# Patient Record
Sex: FEMALE | Race: WHITE | Employment: FULL TIME | URBAN - METROPOLITAN AREA
[De-identification: names, ages, dates, MRNs, and addresses within clinical notes are randomized per-mention and may not be internally consistent; named-entity substitution may affect disease eponyms.]

---

## 2017-10-09 ENCOUNTER — LAB ENCOUNTER (OUTPATIENT)
Dept: LAB | Age: 47
End: 2017-10-09
Attending: OBSTETRICS & GYNECOLOGY
Payer: COMMERCIAL

## 2017-10-09 ENCOUNTER — OFFICE VISIT (OUTPATIENT)
Dept: OBGYN CLINIC | Facility: CLINIC | Age: 47
End: 2017-10-09

## 2017-10-09 VITALS
DIASTOLIC BLOOD PRESSURE: 60 MMHG | HEIGHT: 60 IN | BODY MASS INDEX: 31.02 KG/M2 | HEART RATE: 80 BPM | SYSTOLIC BLOOD PRESSURE: 120 MMHG | WEIGHT: 158 LBS

## 2017-10-09 DIAGNOSIS — Z23 NEED FOR VACCINATION: ICD-10-CM

## 2017-10-09 DIAGNOSIS — Z01.419 WELL WOMAN EXAM WITH ROUTINE GYNECOLOGICAL EXAM: Primary | ICD-10-CM

## 2017-10-09 DIAGNOSIS — Z01.419 WELL WOMAN EXAM WITH ROUTINE GYNECOLOGICAL EXAM: ICD-10-CM

## 2017-10-09 PROCEDURE — 99396 PREV VISIT EST AGE 40-64: CPT | Performed by: OBSTETRICS & GYNECOLOGY

## 2017-10-09 PROCEDURE — 88175 CYTOPATH C/V AUTO FLUID REDO: CPT | Performed by: OBSTETRICS & GYNECOLOGY

## 2017-10-09 PROCEDURE — 82306 VITAMIN D 25 HYDROXY: CPT | Performed by: OBSTETRICS & GYNECOLOGY

## 2017-10-09 PROCEDURE — 90686 IIV4 VACC NO PRSV 0.5 ML IM: CPT | Performed by: OBSTETRICS & GYNECOLOGY

## 2017-10-09 PROCEDURE — 90471 IMMUNIZATION ADMIN: CPT | Performed by: OBSTETRICS & GYNECOLOGY

## 2017-10-09 PROCEDURE — 80061 LIPID PANEL: CPT | Performed by: OBSTETRICS & GYNECOLOGY

## 2017-10-09 PROCEDURE — 87624 HPV HI-RISK TYP POOLED RSLT: CPT | Performed by: OBSTETRICS & GYNECOLOGY

## 2017-10-09 PROCEDURE — 80050 GENERAL HEALTH PANEL: CPT | Performed by: OBSTETRICS & GYNECOLOGY

## 2017-10-09 RX ORDER — METHIMAZOLE 5 MG/1
TABLET ORAL
COMMUNITY
Start: 2017-10-01 | End: 2017-10-09

## 2017-10-09 NOTE — PROGRESS NOTES
Carlita Arreaga is a 49year old female  No LMP recorded. Patient has had an ablation. Patient presents with:  Wellness Visit  . Occasionally has some pink spotting with wiping. She has had an ablation. Minimal hot flashes or night sweats.   I daily., Disp: , Rfl:   •  Multiple Vitamins-Iron (ONCE DAILY/IRON) Oral Tab, Take  by mouth daily. , Disp: , Rfl:     ALLERGIES:    Shellfish-Derived P*        Comment:Hives-      Review of Systems:  Constitutional:  Denies fatigue, night sweats, hot flashe PANEL (14); Future  -     LIPID PANEL;  Future  -     ASSAY, THYROID STIM HORMONE; Future  -     VITAMIN D, 25-HYDROXY; Future

## 2017-10-10 ENCOUNTER — MED REC SCAN ONLY (OUTPATIENT)
Dept: OBGYN CLINIC | Facility: CLINIC | Age: 47
End: 2017-10-10

## 2017-12-28 ENCOUNTER — HOSPITAL ENCOUNTER (OUTPATIENT)
Dept: MAMMOGRAPHY | Age: 47
Discharge: HOME | End: 2017-12-28
Attending: OBSTETRICS & GYNECOLOGY
Payer: COMMERCIAL

## 2017-12-28 DIAGNOSIS — Z01.419 WELL WOMAN EXAM WITH ROUTINE GYNECOLOGICAL EXAM: ICD-10-CM

## 2017-12-28 PROCEDURE — 77067 SCR MAMMO BI INCL CAD: CPT | Performed by: OBSTETRICS & GYNECOLOGY

## 2019-11-27 ENCOUNTER — LAB ENCOUNTER (OUTPATIENT)
Dept: LAB | Age: 49
End: 2019-11-27
Attending: OBSTETRICS & GYNECOLOGY
Payer: COMMERCIAL

## 2019-11-27 ENCOUNTER — OFFICE VISIT (OUTPATIENT)
Dept: OBGYN CLINIC | Facility: CLINIC | Age: 49
End: 2019-11-27
Payer: COMMERCIAL

## 2019-11-27 VITALS
HEIGHT: 60 IN | HEART RATE: 77 BPM | DIASTOLIC BLOOD PRESSURE: 68 MMHG | SYSTOLIC BLOOD PRESSURE: 118 MMHG | WEIGHT: 133 LBS | BODY MASS INDEX: 26.11 KG/M2

## 2019-11-27 DIAGNOSIS — Z01.419 WELL WOMAN EXAM WITH ROUTINE GYNECOLOGICAL EXAM: ICD-10-CM

## 2019-11-27 DIAGNOSIS — Z12.31 ENCOUNTER FOR SCREENING MAMMOGRAM FOR MALIGNANT NEOPLASM OF BREAST: Primary | ICD-10-CM

## 2019-11-27 PROCEDURE — 80061 LIPID PANEL: CPT | Performed by: OBSTETRICS & GYNECOLOGY

## 2019-11-27 PROCEDURE — 80050 GENERAL HEALTH PANEL: CPT | Performed by: OBSTETRICS & GYNECOLOGY

## 2019-11-27 PROCEDURE — 99396 PREV VISIT EST AGE 40-64: CPT | Performed by: OBSTETRICS & GYNECOLOGY

## 2019-11-27 NOTE — PROGRESS NOTES
Rosita Smtih is a 47year old female  No LMP recorded. Patient has had an ablation. Patient presents with:  Wellness Visit  . Bleeding no spotting. Occasional hot flashes and night sweats.   Her neurologist took her off her hyper thyroid med file    Tobacco Use      Smoking status: Never Smoker      Smokeless tobacco: Never Used    Substance and Sexual Activity      Alcohol use: Yes        Frequency: Monthly or less        Drinks per session: 1 or 2        Binge frequency: Never      Drug use: MEDICATIONS:    Current Outpatient Medications:   •  TURMERIC OR, Take by mouth., Disp: , Rfl:   •  Multiple Vitamins-Iron (ONCE DAILY/IRON) Oral Tab, Take  by mouth daily. , Disp: , Rfl:     ALLERGIES:    Asparagus               HIVES  Shellfish-Deri exam

## 2019-12-19 ENCOUNTER — HOSPITAL ENCOUNTER (OUTPATIENT)
Dept: MAMMOGRAPHY | Age: 49
Discharge: HOME | End: 2019-12-19
Attending: OBSTETRICS & GYNECOLOGY
Payer: COMMERCIAL

## 2019-12-19 DIAGNOSIS — Z12.31 ENCOUNTER FOR SCREENING MAMMOGRAM FOR MALIGNANT NEOPLASM OF BREAST: ICD-10-CM

## 2019-12-19 PROCEDURE — 77067 SCR MAMMO BI INCL CAD: CPT | Performed by: OBSTETRICS & GYNECOLOGY

## 2019-12-19 PROCEDURE — 77063 BREAST TOMOSYNTHESIS BI: CPT | Performed by: OBSTETRICS & GYNECOLOGY

## 2020-12-01 ENCOUNTER — OFFICE VISIT (OUTPATIENT)
Dept: OBGYN CLINIC | Facility: CLINIC | Age: 50
End: 2020-12-01
Payer: COMMERCIAL

## 2020-12-01 VITALS
WEIGHT: 144.38 LBS | DIASTOLIC BLOOD PRESSURE: 64 MMHG | BODY MASS INDEX: 28.35 KG/M2 | SYSTOLIC BLOOD PRESSURE: 118 MMHG | HEIGHT: 60 IN | HEART RATE: 68 BPM | RESPIRATION RATE: 14 BRPM

## 2020-12-01 DIAGNOSIS — Z01.419 WELL WOMAN EXAM WITH ROUTINE GYNECOLOGICAL EXAM: ICD-10-CM

## 2020-12-01 DIAGNOSIS — Z12.4 PAPANICOLAOU SMEAR FOR CERVICAL CANCER SCREENING: ICD-10-CM

## 2020-12-01 DIAGNOSIS — Z12.31 ENCOUNTER FOR SCREENING MAMMOGRAM FOR BREAST CANCER: Primary | ICD-10-CM

## 2020-12-01 DIAGNOSIS — Z23 NEED FOR VACCINATION: ICD-10-CM

## 2020-12-01 PROCEDURE — 90471 IMMUNIZATION ADMIN: CPT | Performed by: OBSTETRICS & GYNECOLOGY

## 2020-12-01 PROCEDURE — 88175 CYTOPATH C/V AUTO FLUID REDO: CPT | Performed by: OBSTETRICS & GYNECOLOGY

## 2020-12-01 PROCEDURE — 3008F BODY MASS INDEX DOCD: CPT | Performed by: OBSTETRICS & GYNECOLOGY

## 2020-12-01 PROCEDURE — 99396 PREV VISIT EST AGE 40-64: CPT | Performed by: OBSTETRICS & GYNECOLOGY

## 2020-12-01 PROCEDURE — 3074F SYST BP LT 130 MM HG: CPT | Performed by: OBSTETRICS & GYNECOLOGY

## 2020-12-01 PROCEDURE — 90686 IIV4 VACC NO PRSV 0.5 ML IM: CPT | Performed by: OBSTETRICS & GYNECOLOGY

## 2020-12-01 PROCEDURE — 3078F DIAST BP <80 MM HG: CPT | Performed by: OBSTETRICS & GYNECOLOGY

## 2020-12-01 NOTE — PROGRESS NOTES
Zafar Aguilar is a 45year old female  No LMP recorded. Patient has had an ablation. Patient presents with:  Physical: PAP: 10/9/2017 Due:   . She has not had any bleeding.   Occasional hot flash occasional vaginal dryness does not desire Smokeless tobacco: Never Used    Substance and Sexual Activity      Alcohol use: Yes        Frequency: Monthly or less        Drinks per session: 1 or 2        Binge frequency: Never      Drug use: No      Sexual activity: Not on file    Lifestyle      P mouth daily as needed. , Disp: , Rfl:   •  Multiple Vitamins-Iron (ONCE DAILY/IRON) Oral Tab, Take  by mouth daily. , Disp: , Rfl:     ALLERGIES:    Asparagus               HIVES  Shellfish-Derived P*    HIVES      Review of Systems:  Constitutional:  [facility] Im

## 2020-12-14 ENCOUNTER — TELEPHONE (OUTPATIENT)
Dept: OBGYN CLINIC | Facility: CLINIC | Age: 50
End: 2020-12-14

## 2020-12-23 ENCOUNTER — LAB ENCOUNTER (OUTPATIENT)
Dept: LAB | Age: 50
End: 2020-12-23
Attending: OBSTETRICS & GYNECOLOGY
Payer: COMMERCIAL

## 2020-12-23 ENCOUNTER — OFFICE VISIT (OUTPATIENT)
Dept: OBGYN CLINIC | Facility: CLINIC | Age: 50
End: 2020-12-23
Payer: COMMERCIAL

## 2020-12-23 ENCOUNTER — HOSPITAL ENCOUNTER (OUTPATIENT)
Dept: MAMMOGRAPHY | Age: 50
Discharge: HOME | End: 2020-12-23
Attending: OBSTETRICS & GYNECOLOGY
Payer: COMMERCIAL

## 2020-12-23 VITALS — SYSTOLIC BLOOD PRESSURE: 104 MMHG | BODY MASS INDEX: 28 KG/M2 | DIASTOLIC BLOOD PRESSURE: 52 MMHG | HEIGHT: 60 IN

## 2020-12-23 DIAGNOSIS — Z12.31 ENCOUNTER FOR SCREENING MAMMOGRAM FOR BREAST CANCER: ICD-10-CM

## 2020-12-23 DIAGNOSIS — Z12.4 PAPANICOLAOU SMEAR FOR CERVICAL CANCER SCREENING: ICD-10-CM

## 2020-12-23 DIAGNOSIS — R87.619 ENCOUNTER FOR PAPANICOLAOU CERVICAL SMEAR FOLLOWING PRIOR ABNORMAL SMEAR: Primary | ICD-10-CM

## 2020-12-23 DIAGNOSIS — R87.615 PAP SMEAR OF CERVIX UNSATISFACTORY: ICD-10-CM

## 2020-12-23 DIAGNOSIS — Z12.4 ENCOUNTER FOR PAPANICOLAOU CERVICAL SMEAR FOLLOWING PRIOR ABNORMAL SMEAR: Primary | ICD-10-CM

## 2020-12-23 DIAGNOSIS — Z01.419 WELL WOMAN EXAM WITH ROUTINE GYNECOLOGICAL EXAM: ICD-10-CM

## 2020-12-23 PROCEDURE — 3074F SYST BP LT 130 MM HG: CPT | Performed by: OBSTETRICS & GYNECOLOGY

## 2020-12-23 PROCEDURE — 3078F DIAST BP <80 MM HG: CPT | Performed by: OBSTETRICS & GYNECOLOGY

## 2020-12-23 PROCEDURE — 88175 CYTOPATH C/V AUTO FLUID REDO: CPT | Performed by: OBSTETRICS & GYNECOLOGY

## 2020-12-23 PROCEDURE — 77067 SCR MAMMO BI INCL CAD: CPT | Performed by: OBSTETRICS & GYNECOLOGY

## 2020-12-23 PROCEDURE — 80053 COMPREHEN METABOLIC PANEL: CPT

## 2020-12-23 PROCEDURE — 36415 COLL VENOUS BLD VENIPUNCTURE: CPT

## 2020-12-23 PROCEDURE — 77063 BREAST TOMOSYNTHESIS BI: CPT | Performed by: OBSTETRICS & GYNECOLOGY

## 2020-12-23 PROCEDURE — 80061 LIPID PANEL: CPT

## 2020-12-23 PROCEDURE — 87624 HPV HI-RISK TYP POOLED RSLT: CPT | Performed by: OBSTETRICS & GYNECOLOGY

## 2020-12-23 PROCEDURE — 84443 ASSAY THYROID STIM HORMONE: CPT

## 2020-12-23 PROCEDURE — 85025 COMPLETE CBC W/AUTO DIFF WBC: CPT

## 2020-12-23 NOTE — PROGRESS NOTES
Her Pap smears have always been normal in the past.  Flu shots been done. External genitalia without lesions. Cervix without lesions vagina atrophic Pap done.

## 2020-12-31 DIAGNOSIS — Z12.4 PAPANICOLAOU SMEAR FOR CERVICAL CANCER SCREENING: Primary | ICD-10-CM

## 2021-01-05 LAB — HPV I/H RISK 1 DNA SPEC QL NAA+PROBE: NEGATIVE

## 2021-02-26 ENCOUNTER — LAB ENCOUNTER (OUTPATIENT)
Dept: LAB | Age: 51
End: 2021-02-26
Attending: NURSE PRACTITIONER
Payer: COMMERCIAL

## 2021-02-26 DIAGNOSIS — Z01.818 PRE-OP TESTING: ICD-10-CM

## 2021-02-27 LAB — SARS-COV-2 RNA RESP QL NAA+PROBE: NOT DETECTED

## 2021-03-01 PROBLEM — K21.9 GASTROESOPHAGEAL REFLUX DISEASE: Status: ACTIVE | Noted: 2021-03-01

## 2021-03-13 DIAGNOSIS — Z23 NEED FOR VACCINATION: ICD-10-CM

## 2021-03-24 PROBLEM — D13.1: Status: ACTIVE | Noted: 2021-03-24

## 2022-01-12 ENCOUNTER — OFFICE VISIT (OUTPATIENT)
Dept: OBGYN CLINIC | Facility: CLINIC | Age: 52
End: 2022-01-12
Payer: COMMERCIAL

## 2022-01-12 VITALS
HEART RATE: 97 BPM | DIASTOLIC BLOOD PRESSURE: 72 MMHG | WEIGHT: 150.38 LBS | SYSTOLIC BLOOD PRESSURE: 122 MMHG | BODY MASS INDEX: 29.52 KG/M2 | HEIGHT: 60 IN

## 2022-01-12 DIAGNOSIS — Z01.419 WELL WOMAN EXAM WITH ROUTINE GYNECOLOGICAL EXAM: ICD-10-CM

## 2022-01-12 DIAGNOSIS — Z12.31 ENCOUNTER FOR SCREENING MAMMOGRAM FOR BREAST CANCER: Primary | ICD-10-CM

## 2022-01-12 PROCEDURE — 3074F SYST BP LT 130 MM HG: CPT | Performed by: OBSTETRICS & GYNECOLOGY

## 2022-01-12 PROCEDURE — 3008F BODY MASS INDEX DOCD: CPT | Performed by: OBSTETRICS & GYNECOLOGY

## 2022-01-12 PROCEDURE — 3078F DIAST BP <80 MM HG: CPT | Performed by: OBSTETRICS & GYNECOLOGY

## 2022-01-12 PROCEDURE — 99396 PREV VISIT EST AGE 40-64: CPT | Performed by: OBSTETRICS & GYNECOLOGY

## 2022-01-12 NOTE — PROGRESS NOTES
Livan Conner is a 45year old female [id number] Patient's last menstrual period was 06/23/2015 (exact date). Patient presents with:  Wellness Visit  . No vaginal bleeding colonoscopy was done about a year ago her father had colon cancer.   Blood work w 06/15,05/15       SOCIAL HISTORY:  Social History    Socioeconomic History      Marital status:       Spouse name: Not on file      Number of children: Not on file      Years of education: Not on file      Highest education level: Not on file    Occ 14   • No Known Problems Son    • Other (benign rolandic epilepsy) Son 8       MEDICATIONS:    Current Outpatient Medications:   •  cetirizine 10 MG Oral Tab, Take 10 mg by mouth daily. , Disp: , Rfl:   •  TURMERIC OR, Take by mouth daily as needed.   , Disp screening mammogram for breast cancer  -     [facility] KRISTINA 2D+3D SCREENING BILAT (CPT=77067/15406); Future        Blood work.

## 2022-07-12 ENCOUNTER — LAB ENCOUNTER (OUTPATIENT)
Dept: LAB | Age: 52
End: 2022-07-12
Attending: OBSTETRICS & GYNECOLOGY
Payer: COMMERCIAL

## 2022-07-12 ENCOUNTER — HOSPITAL ENCOUNTER (OUTPATIENT)
Dept: MAMMOGRAPHY | Age: 52
Discharge: HOME | End: 2022-07-12
Attending: OBSTETRICS & GYNECOLOGY
Payer: COMMERCIAL

## 2022-07-12 DIAGNOSIS — Z12.31 ENCOUNTER FOR SCREENING MAMMOGRAM FOR BREAST CANCER: ICD-10-CM

## 2022-07-12 LAB
ALBUMIN SERPL-MCNC: 4 G/DL (ref 3.4–5)
ALBUMIN/GLOB SERPL: 1.4 {RATIO} (ref 1–2)
ALP LIVER SERPL-CCNC: 61 U/L
ALT SERPL-CCNC: 32 U/L
ANION GAP SERPL CALC-SCNC: 8 MMOL/L (ref 0–18)
AST SERPL-CCNC: 19 U/L (ref 15–37)
BASOPHILS # BLD AUTO: 0.03 X10(3) UL (ref 0–0.2)
BASOPHILS NFR BLD AUTO: 0.6 %
BILIRUB SERPL-MCNC: 1.1 MG/DL (ref 0.1–2)
BUN BLD-MCNC: 16 MG/DL (ref 7–18)
CALCIUM BLD-MCNC: 9.3 MG/DL (ref 8.5–10.1)
CHLORIDE SERPL-SCNC: 109 MMOL/L (ref 98–112)
CHOLEST SERPL-MCNC: 159 MG/DL (ref ?–200)
CO2 SERPL-SCNC: 25 MMOL/L (ref 21–32)
CREAT BLD-MCNC: 0.73 MG/DL
EOSINOPHIL # BLD AUTO: 0.06 X10(3) UL (ref 0–0.7)
EOSINOPHIL NFR BLD AUTO: 1.2 %
ERYTHROCYTE [DISTWIDTH] IN BLOOD BY AUTOMATED COUNT: 13.4 %
FASTING PATIENT LIPID ANSWER: YES
FASTING STATUS PATIENT QL REPORTED: YES
GLOBULIN PLAS-MCNC: 2.9 G/DL (ref 2.8–4.4)
GLUCOSE BLD-MCNC: 99 MG/DL (ref 70–99)
HCT VFR BLD AUTO: 39.7 %
HDLC SERPL-MCNC: 66 MG/DL (ref 40–59)
HGB BLD-MCNC: 12.8 G/DL
IMM GRANULOCYTES # BLD AUTO: 0.02 X10(3) UL (ref 0–1)
IMM GRANULOCYTES NFR BLD: 0.4 %
LDLC SERPL CALC-MCNC: 81 MG/DL (ref ?–100)
LYMPHOCYTES # BLD AUTO: 1.32 X10(3) UL (ref 1–4)
LYMPHOCYTES NFR BLD AUTO: 26.3 %
MCH RBC QN AUTO: 30.8 PG (ref 26–34)
MCHC RBC AUTO-ENTMCNC: 32.2 G/DL (ref 31–37)
MCV RBC AUTO: 95.7 FL
MONOCYTES # BLD AUTO: 0.37 X10(3) UL (ref 0.1–1)
MONOCYTES NFR BLD AUTO: 7.4 %
NEUTROPHILS # BLD AUTO: 3.22 X10 (3) UL (ref 1.5–7.7)
NEUTROPHILS # BLD AUTO: 3.22 X10(3) UL (ref 1.5–7.7)
NEUTROPHILS NFR BLD AUTO: 64.1 %
NONHDLC SERPL-MCNC: 93 MG/DL (ref ?–130)
OSMOLALITY SERPL CALC.SUM OF ELEC: 295 MOSM/KG (ref 275–295)
PLATELET # BLD AUTO: 215 10(3)UL (ref 150–450)
POTASSIUM SERPL-SCNC: 3.5 MMOL/L (ref 3.5–5.1)
PROT SERPL-MCNC: 6.9 G/DL (ref 6.4–8.2)
RBC # BLD AUTO: 4.15 X10(6)UL
SODIUM SERPL-SCNC: 142 MMOL/L (ref 136–145)
TRIGL SERPL-MCNC: 61 MG/DL (ref 30–149)
TSI SER-ACNC: 3.56 MIU/ML (ref 0.36–3.74)
VLDLC SERPL CALC-MCNC: 10 MG/DL (ref 0–30)
WBC # BLD AUTO: 5 X10(3) UL (ref 4–11)

## 2022-07-12 PROCEDURE — 77063 BREAST TOMOSYNTHESIS BI: CPT | Performed by: OBSTETRICS & GYNECOLOGY

## 2022-07-12 PROCEDURE — 80050 GENERAL HEALTH PANEL: CPT | Performed by: OBSTETRICS & GYNECOLOGY

## 2022-07-12 PROCEDURE — 80061 LIPID PANEL: CPT | Performed by: OBSTETRICS & GYNECOLOGY

## 2022-07-12 PROCEDURE — 77067 SCR MAMMO BI INCL CAD: CPT | Performed by: OBSTETRICS & GYNECOLOGY

## 2022-08-16 ENCOUNTER — ORDER TRANSCRIPTION (OUTPATIENT)
Dept: ADMINISTRATIVE | Facility: HOSPITAL | Age: 52
End: 2022-08-16

## 2022-08-16 DIAGNOSIS — Z01.818 PREOP EXAMINATION: Primary | ICD-10-CM

## 2022-08-24 ENCOUNTER — LAB ENCOUNTER (OUTPATIENT)
Dept: LAB | Age: 52
End: 2022-08-24
Attending: PHYSICIAN ASSISTANT
Payer: COMMERCIAL

## 2022-08-24 DIAGNOSIS — Z01.818 PREOP EXAMINATION: ICD-10-CM

## 2022-08-25 LAB — SARS-COV-2 RNA RESP QL NAA+PROBE: NOT DETECTED

## 2022-08-27 ENCOUNTER — HOSPITAL ENCOUNTER (OUTPATIENT)
Dept: GENERAL RADIOLOGY | Facility: HOSPITAL | Age: 52
Discharge: HOME | End: 2022-08-27
Attending: PHYSICIAN ASSISTANT
Payer: COMMERCIAL

## 2022-08-27 DIAGNOSIS — K21.9 GERD (GASTROESOPHAGEAL REFLUX DISEASE): ICD-10-CM

## 2022-08-27 PROCEDURE — 74246 X-RAY XM UPR GI TRC 2CNTRST: CPT | Performed by: PHYSICIAN ASSISTANT

## 2022-10-26 PROBLEM — N95.9 MENOPAUSAL AND POSTMENOPAUSAL DISORDER: Status: ACTIVE | Noted: 2022-10-26

## 2022-10-26 PROBLEM — E55.9 VITAMIN D DEFICIENCY: Status: ACTIVE | Noted: 2022-10-26

## 2022-10-26 PROBLEM — J30.9 ALLERGIC RHINITIS: Status: ACTIVE | Noted: 2022-10-26

## 2022-10-26 PROBLEM — R13.10 ODYNOPHAGIA: Status: ACTIVE | Noted: 2022-10-26

## 2022-10-26 PROBLEM — N95.1 MENOPAUSAL SYMPTOM: Status: ACTIVE | Noted: 2022-10-26

## 2022-10-26 PROBLEM — D72.829 LEUKOCYTOSIS: Status: ACTIVE | Noted: 2022-10-26

## 2022-10-26 PROBLEM — E05.90 HYPERTHYROIDISM: Status: ACTIVE | Noted: 2022-10-26

## 2022-12-01 ENCOUNTER — HOSPITAL ENCOUNTER (OUTPATIENT)
Facility: HOSPITAL | Age: 52
Setting detail: HOSPITAL OUTPATIENT SURGERY
Discharge: HOME | End: 2022-12-01
Attending: INTERNAL MEDICINE | Admitting: INTERNAL MEDICINE
Payer: COMMERCIAL

## 2022-12-01 ENCOUNTER — ANESTHESIA EVENT (OUTPATIENT)
Dept: ENDOSCOPY | Facility: HOSPITAL | Age: 52
End: 2022-12-01
Payer: COMMERCIAL

## 2022-12-01 ENCOUNTER — ANESTHESIA (OUTPATIENT)
Dept: ENDOSCOPY | Facility: HOSPITAL | Age: 52
End: 2022-12-01
Payer: COMMERCIAL

## 2022-12-01 VITALS
WEIGHT: 153 LBS | OXYGEN SATURATION: 96 % | BODY MASS INDEX: 30.04 KG/M2 | TEMPERATURE: 98 F | SYSTOLIC BLOOD PRESSURE: 122 MMHG | RESPIRATION RATE: 16 BRPM | HEIGHT: 60 IN | DIASTOLIC BLOOD PRESSURE: 80 MMHG | HEART RATE: 66 BPM

## 2022-12-01 DIAGNOSIS — Z20.822 ENCOUNTER FOR PREOPERATIVE SCREENING LABORATORY TESTING FOR COVID-19 VIRUS: Primary | ICD-10-CM

## 2022-12-01 DIAGNOSIS — Z01.812 ENCOUNTER FOR PREOPERATIVE SCREENING LABORATORY TESTING FOR COVID-19 VIRUS: Primary | ICD-10-CM

## 2022-12-01 PROCEDURE — BD47ZZZ ULTRASONOGRAPHY OF GASTROINTESTINAL TRACT: ICD-10-PCS | Performed by: INTERNAL MEDICINE

## 2022-12-01 PROCEDURE — 0DJ08ZZ INSPECTION OF UPPER INTESTINAL TRACT, VIA NATURAL OR ARTIFICIAL OPENING ENDOSCOPIC: ICD-10-PCS | Performed by: INTERNAL MEDICINE

## 2022-12-01 RX ORDER — SODIUM CHLORIDE, SODIUM LACTATE, POTASSIUM CHLORIDE, CALCIUM CHLORIDE 600; 310; 30; 20 MG/100ML; MG/100ML; MG/100ML; MG/100ML
INJECTION, SOLUTION INTRAVENOUS CONTINUOUS
Status: DISCONTINUED | OUTPATIENT
Start: 2022-12-01 | End: 2022-12-01

## 2022-12-01 RX ORDER — NALOXONE HYDROCHLORIDE 0.4 MG/ML
80 INJECTION, SOLUTION INTRAMUSCULAR; INTRAVENOUS; SUBCUTANEOUS AS NEEDED
Status: DISCONTINUED | OUTPATIENT
Start: 2022-12-01 | End: 2022-12-01

## 2022-12-01 RX ORDER — HYDROMORPHONE HYDROCHLORIDE 1 MG/ML
0.6 INJECTION, SOLUTION INTRAMUSCULAR; INTRAVENOUS; SUBCUTANEOUS EVERY 5 MIN PRN
Status: DISCONTINUED | OUTPATIENT
Start: 2022-12-01 | End: 2022-12-01

## 2022-12-01 RX ORDER — HYDROMORPHONE HYDROCHLORIDE 1 MG/ML
0.4 INJECTION, SOLUTION INTRAMUSCULAR; INTRAVENOUS; SUBCUTANEOUS EVERY 5 MIN PRN
Status: DISCONTINUED | OUTPATIENT
Start: 2022-12-01 | End: 2022-12-01

## 2022-12-01 RX ORDER — HYDROMORPHONE HYDROCHLORIDE 1 MG/ML
0.2 INJECTION, SOLUTION INTRAMUSCULAR; INTRAVENOUS; SUBCUTANEOUS EVERY 5 MIN PRN
Status: DISCONTINUED | OUTPATIENT
Start: 2022-12-01 | End: 2022-12-01

## 2022-12-01 RX ORDER — LIDOCAINE HYDROCHLORIDE 10 MG/ML
INJECTION, SOLUTION EPIDURAL; INFILTRATION; INTRACAUDAL; PERINEURAL AS NEEDED
Status: DISCONTINUED | OUTPATIENT
Start: 2022-12-01 | End: 2022-12-01 | Stop reason: SURG

## 2022-12-01 RX ADMIN — LIDOCAINE HYDROCHLORIDE 25 MG: 10 INJECTION, SOLUTION EPIDURAL; INFILTRATION; INTRACAUDAL; PERINEURAL at 11:44:00

## 2022-12-01 RX ADMIN — SODIUM CHLORIDE, SODIUM LACTATE, POTASSIUM CHLORIDE, CALCIUM CHLORIDE: 600; 310; 30; 20 INJECTION, SOLUTION INTRAVENOUS at 11:58:00

## 2022-12-01 RX ADMIN — SODIUM CHLORIDE, SODIUM LACTATE, POTASSIUM CHLORIDE, CALCIUM CHLORIDE: 600; 310; 30; 20 INJECTION, SOLUTION INTRAVENOUS at 11:44:00

## 2022-12-01 NOTE — DISCHARGE INSTRUCTIONS
Home Care Instructions for  Gastroscopy/EUS with Sedation    Diet:  - Resume your regular diet as tolerated unless otherwise instructed. - Start with light meals to minimize bloating.  - Do not drink alcohol today. Medication:  - If you have questions about resuming your normal medications, please contact your Primary Care Physician. Activities:  - Take it easy today. Do not return to work today. - Do not drive today. - Do not operate any machinery today (including kitchen equipment). Gastroscopy:  - You may have a sore throat for 2-3 days following the exam. This is normal. Gargling with warm salt water (1/2 tsp salt to 1 glass warm water) or using throat lozenges will help. - If you experience any sharp pain in your neck, abdomen or chest, vomiting of blood, oral temperature over 100 degrees Fahrenheit, light-headedness or dizziness, or any other problems, contact your doctor. **If unable to reach your doctor, please go to the [facility] Emergency Room**    - Your referring physician will receive a full report of your examination.  - If you do not hear from your doctor's office within two weeks of your biopsy, please call them for your results. You may be able to see your laboratory results in [facility] between 4 and 7 business days. In some cases, your physician may not have viewed the results before they are released to [address] If you have questions regarding your results contact the physician who ordered the test/exam by phone or via [address] by choosing \"Ask a Medical Question. \"

## 2023-08-22 ENCOUNTER — OFFICE VISIT (OUTPATIENT)
Facility: CLINIC | Age: 53
End: 2023-08-22
Payer: COMMERCIAL

## 2023-08-22 VITALS
BODY MASS INDEX: 32.04 KG/M2 | DIASTOLIC BLOOD PRESSURE: 78 MMHG | WEIGHT: 163.19 LBS | HEIGHT: 60 IN | HEART RATE: 72 BPM | SYSTOLIC BLOOD PRESSURE: 124 MMHG

## 2023-08-22 DIAGNOSIS — Z12.31 ENCOUNTER FOR SCREENING MAMMOGRAM FOR BREAST CANCER: ICD-10-CM

## 2023-08-22 DIAGNOSIS — Z12.4 PAPANICOLAOU SMEAR FOR CERVICAL CANCER SCREENING: ICD-10-CM

## 2023-08-22 DIAGNOSIS — Z01.419 WELL WOMAN EXAM WITH ROUTINE GYNECOLOGICAL EXAM: Primary | ICD-10-CM

## 2023-08-22 PROCEDURE — 87624 HPV HI-RISK TYP POOLED RSLT: CPT | Performed by: OBSTETRICS & GYNECOLOGY

## 2023-08-22 PROCEDURE — 3008F BODY MASS INDEX DOCD: CPT | Performed by: OBSTETRICS & GYNECOLOGY

## 2023-08-22 PROCEDURE — 88175 CYTOPATH C/V AUTO FLUID REDO: CPT | Performed by: OBSTETRICS & GYNECOLOGY

## 2023-08-22 PROCEDURE — 3078F DIAST BP <80 MM HG: CPT | Performed by: OBSTETRICS & GYNECOLOGY

## 2023-08-22 PROCEDURE — 3074F SYST BP LT 130 MM HG: CPT | Performed by: OBSTETRICS & GYNECOLOGY

## 2023-08-22 PROCEDURE — 99396 PREV VISIT EST AGE 40-64: CPT | Performed by: OBSTETRICS & GYNECOLOGY

## 2023-08-22 NOTE — PROGRESS NOTES
Rhys Knox is a 47year old female [id number] Patient's last menstrual period was 2015 (exact date). Patient presents with:  Wellness Visit: 20 neg last pap   No concerns  . She does not have any vaginal bleeding. Her hot flashes and night sweats are getting better. Her primary does her cholesterol and thyroid. Vitamin D was encouraged. Grandmother had breast cancer but was advanced age. She did a colonoscopy and is not due for a year.     OBSTETRICS HISTORY:  OB History    Para Term  AB Living   3 3 3     3   SAB IAB Ectopic Multiple Live Births                  # Outcome Date GA Lbr Keshawn/2nd Weight Sex Delivery Anes PTL Lv   3 Term 02 40w0d   M NORMAL SPONT      2 Term 00 40w0d   M NORMAL SPONT      1 Term 97 40w0d   M NORMAL SPONT          GYNE HISTORY:  Periods none due to menopause      Sexual activity:   Yes      Partners:   Male      Comment:   Ablation 2015           Pap Date: 20  Pap Result Notes: neg/neg        MEDICAL HISTORY:  Past Medical History:   Diagnosis Date    Abdominal pain     Arthritis     In knee    Belching     Bloating     Occasionally    Blood in the stool     Had stool sample testing before    Diarrhea, unspecified     Flatulence/gas pain/belching     Occasionally    H/O mammogram 05/15,    benign    Heartburn     Occasionally    Heavy menses     Had ablation done in     Hemorrhoids     History of 2019 novel coronavirus disease (COVID-19) 2022    sore throat; no hospitalization; no lingering symptoms    Leaking of urine     Occasionally when exercise involves jumping    Night sweats     Occasionally    Osteoarthrosis, unspecified whether generalized or localized, unspecified site     right knee- rec'd steroid injection     Pap smear for cervical cancer screening 01/15,    negative    Unspecified disorder of thyroid     Wears glasses     One contact in left eye       SURGICAL HISTORY:  Past Surgical History:   Procedure Laterality Date    Ablation      Colonoscopy      Colonoscopy      In 2015    Endometrial ablation  06/23/2015    Endometrial biopsy - jar(s): 2  06/15,05/15       SOCIAL HISTORY:  Social History    Socioeconomic History      Marital status:       Spouse name: Not on file      Number of children: Not on file      Years of education: Not on file      Highest education level: Not on file    Occupational History      Not on file    Tobacco Use      Smoking status: Never      Smokeless tobacco: Never    Vaping Use      Vaping Use: Never used    Substance and Sexual Activity      Alcohol use: Not Currently        Comment: Maybe 1 drink a month at most      Drug use: No      Sexual activity: Yes        Partners: Male        Comment: Ablation 6/2015    Other Topics      Concerns:         Service: Not Asked        Blood Transfusions: Not Asked        Caffeine Concern: No        Occupational Exposure: Not Asked        Hobby Hazards: Not Asked        Sleep Concern: Not Asked        Stress Concern: No        Weight Concern: Yes        Special Diet: No        Back Care: Not Asked        Exercise: Yes        Bike Helmet: Not Asked        Seat Belt: Yes        Self-Exams: Not Asked    Social History Narrative      Not on file    Social Determinants of Health  Financial Resource Strain: Not on file  Food Insecurity: Not on file  Transportation Needs: Not on file  Physical Activity: Not on file  Stress: Not on file  Social Connections: Not on file  Housing Stability: Not on file    FAMILY HISTORY:  Family History   Problem Relation Age of Onset    Breast Cancer Paternal Grandmother 79    Colon Cancer Paternal Grandfather         dx age 63's    Uterine Cancer Paternal Aunt 72    Ovarian Cancer Paternal Aunt         mid 62s    Colon Cancer Father 76    Thyroid Disorder Father     Prostate Cancer Father     Stroke Mother 62    Diabetes Mother         unknown    Hypertension Mother         unknown Lipids Mother         unknown    Other (Vasovagal syncope) Son 13    No Known Problems Son     Other (benign rolandic epilepsy) Son 8       MEDICATIONS:    Current Outpatient Medications:     Thyroid (LEVOTHYROXINE-LIOTHYRONINE OR), Take 0.025 mg by mouth daily. , Disp: , Rfl:     lansoprazole (PREVACID) 30 MG Oral Capsule Delayed Release, Take 1 capsule (30 mg total) by mouth daily. , Disp: 90 capsule, Rfl: 3    Multiple Vitamins-Iron (ONCE DAILY/IRON) Oral Tab, Take  by mouth daily. , Disp: , Rfl:     ALLERGIES:    Asparagus               HIVES  Asparagus               HIVES  Shellfish               HIVES  Shellfish-Derived P*    HIVES      Review of Systems:  Constitutional:  Denies fatigue, night sweats, hot flashes  Gastrointestinal:  denies heartburn, abdominal pain, diarrhea or constipation  Genitourinary:  denies dysuria, incontinence, abnormal vaginal discharge, vaginal itching  Skin/Breast:  Denies any breast pain, lumps, or discharge. Neurological:  denies headaches, extremity weakness or numbness. PHYSICAL EXAM:   Constitutional: well developed, well nourished  Head/Face: normocephalic  Neck/Thyroid: thyroid symmetric, no thyromegaly, no nodules, no adenopathy  Breast: normal without palpable masses, tenderness, asymmetry, nipple discharge, nipple retraction or skin changes  Abdomen:  soft, nontender, nondistended, no masses  Skin/Hair: no unusual rashes or bruises   Extremities: no edema, no cyanosis  Psychiatric:  Oriented to time, place, person and situation. Appropriate mood and affect    Pelvic Exam:  External Genitalia: normal appearance, hair distribution, and no lesions  Urethral Meatus:  normal in size, location, without lesions and prolapse  Bladder:  No fullness, masses or tenderness  Vagina:  Normal appearance without lesions, no abnormal discharge  Cervix:  Normal without tenderness on motion out lesions Pap.   Actually I will  Uterus: normal in size, contour, position, mobility, without tenderness  Adnexa: normal without masses or tenderness  Perineum: normal  Anus: no hemorroids     Assessment & Plan:  Domingo Son was seen today for wellness visit. Diagnoses and all orders for this visit:    Well woman exam with routine gynecological exam    Papanicolaou smear for cervical cancer screening  -     THINPREP PAP WITH HPV REFLEX REQUEST B; Future    Encounter for screening mammogram for breast cancer  -     [facility] KRISTINA 2D+3D SCREENING BILAT (CPT=77067/45301);  Future

## 2023-08-28 LAB — HPV I/H RISK 1 DNA SPEC QL NAA+PROBE: NEGATIVE

## 2024-02-14 ENCOUNTER — HOSPITAL ENCOUNTER (OUTPATIENT)
Dept: MAMMOGRAPHY | Age: 54
Discharge: HOME | End: 2024-02-14
Attending: OBSTETRICS & GYNECOLOGY
Payer: COMMERCIAL

## 2024-02-14 DIAGNOSIS — Z12.31 ENCOUNTER FOR SCREENING MAMMOGRAM FOR BREAST CANCER: ICD-10-CM

## 2024-02-14 PROBLEM — E07.9 THYROID CONDITION: Status: ACTIVE | Noted: 2024-02-14

## 2024-02-14 PROBLEM — K57.92 DIVERTICULITIS: Status: ACTIVE | Noted: 2024-02-14

## 2024-02-14 PROBLEM — E04.1 THYROID NODULE: Status: ACTIVE | Noted: 2023-03-13

## 2024-02-14 PROCEDURE — 77067 SCR MAMMO BI INCL CAD: CPT | Performed by: OBSTETRICS & GYNECOLOGY

## 2024-02-14 PROCEDURE — 77063 BREAST TOMOSYNTHESIS BI: CPT | Performed by: OBSTETRICS & GYNECOLOGY

## 2024-04-10 PROBLEM — K57.32 DIVERTICULITIS, COLON: Status: ACTIVE | Noted: 2024-04-10

## 2024-04-10 PROBLEM — R12 HEARTBURN: Status: ACTIVE | Noted: 2024-04-10

## 2024-12-14 ENCOUNTER — OFFICE VISIT (OUTPATIENT)
Facility: CLINIC | Age: 54
End: 2024-12-14
Payer: COMMERCIAL

## 2024-12-14 VITALS
HEART RATE: 80 BPM | HEIGHT: 60 IN | DIASTOLIC BLOOD PRESSURE: 82 MMHG | WEIGHT: 149.81 LBS | BODY MASS INDEX: 29.41 KG/M2 | SYSTOLIC BLOOD PRESSURE: 120 MMHG

## 2024-12-14 DIAGNOSIS — Z01.419 WELL WOMAN EXAM WITH ROUTINE GYNECOLOGICAL EXAM: Primary | ICD-10-CM

## 2024-12-14 DIAGNOSIS — Z12.31 ENCOUNTER FOR SCREENING MAMMOGRAM FOR BREAST CANCER: ICD-10-CM

## 2024-12-14 PROCEDURE — 3008F BODY MASS INDEX DOCD: CPT | Performed by: OBSTETRICS & GYNECOLOGY

## 2024-12-14 PROCEDURE — 99396 PREV VISIT EST AGE 40-64: CPT | Performed by: OBSTETRICS & GYNECOLOGY

## 2024-12-14 PROCEDURE — 3074F SYST BP LT 130 MM HG: CPT | Performed by: OBSTETRICS & GYNECOLOGY

## 2024-12-14 PROCEDURE — 3079F DIAST BP 80-89 MM HG: CPT | Performed by: OBSTETRICS & GYNECOLOGY

## 2024-12-14 RX ORDER — METHIMAZOLE 5 MG/1
5 TABLET ORAL DAILY
COMMUNITY
Start: 2024-11-11

## 2024-12-14 NOTE — PROGRESS NOTES
Cassy Morton is a 54 year old female  No LMP recorded. Patient has had an ablation.   Chief Complaint   Patient presents with    Wellness Visit     Last pap 23  Reviewed Preventative/Wellness form with patient.    .     She does not have any vaginal bleeding.  She occasionally will have some hot flashes.  She complains of vaginal dryness but no pain with intercourse.  Pap smears have always been normal.  She had grandmother with breast cancer when she was postmenopausal blood work was recently done with her primary.  She takes vitamin D.    She was hospitalized twice in the last year for diverticulitis no surgery at this time she has had colonoscopies    OBSTETRICS HISTORY:  OB History    Para Term  AB Living   3 3 3     3   SAB IAB Ectopic Multiple Live Births                  # Outcome Date GA Lbr Keshawn/2nd Weight Sex Type Anes PTL Lv   3 Term 02 40w0d   M NORMAL SPONT      2 Term 00 40w0d   M NORMAL SPONT      1 Term 97 40w0d   M NORMAL SPONT          GYNE HISTORY:  Periods none due to menopause    History   Sexual Activity    Sexual activity: Yes    Partners: Male     Comment: Ablation 2015        Hx Prior Abnormal Pap: No  Pap Date: 23  Pap Result Notes: negative        MEDICAL HISTORY:  Past Medical History:    Abdominal pain    Arthritis    In knee    Belching    Bloating    Occasionally    Blood in the stool    Had stool sample testing before    Diarrhea, unspecified    Flatulence/gas pain/belching    Occasionally    H/O mammogram    benign    Heartburn    Occasionally    Heavy menses    Had ablation done in     Hemorrhoids    History of  novel coronavirus disease (COVID-19)    sore throat; no hospitalization; no lingering symptoms    Leaking of urine    Occasionally when exercise involves jumping    Night sweats    Occasionally    Osteoarthrosis, unspecified whether generalized or localized, unspecified site    right knee- rec'd steroid  injection 2015    Pap smear for cervical cancer screening    negative    Unspecified disorder of thyroid    Wears glasses    One contact in left eye       SURGICAL HISTORY:  Past Surgical History:   Procedure Laterality Date    Ablation      Colonoscopy      Colonoscopy      In 2015    Endometrial ablation  06/23/2015    Endometrial biopsy - jar(s): 2  06/15,05/15       SOCIAL HISTORY:  Social History     Socioeconomic History    Marital status:      Spouse name: Not on file    Number of children: Not on file    Years of education: Not on file    Highest education level: Not on file   Occupational History    Not on file   Tobacco Use    Smoking status: Never    Smokeless tobacco: Never   Vaping Use    Vaping status: Never Used   Substance and Sexual Activity    Alcohol use: Not Currently     Comment: Maybe 1 drink a month at most    Drug use: No    Sexual activity: Yes     Partners: Male     Comment: Ablation 6/2015   Other Topics Concern     Service Not Asked    Blood Transfusions Not Asked    Caffeine Concern No    Occupational Exposure Not Asked    Hobby Hazards Not Asked    Sleep Concern Not Asked    Stress Concern No    Weight Concern Yes    Special Diet No    Back Care Not Asked    Exercise Yes    Bike Helmet Not Asked    Seat Belt Yes    Self-Exams Not Asked   Social History Narrative    Not on file     Social Drivers of Health     Financial Resource Strain: Not on file   Food Insecurity: Not on file   Transportation Needs: Not on file   Physical Activity: Not on file   Stress: Not on file   Social Connections: Not on file   Housing Stability: Not on file       FAMILY HISTORY:  Family History   Problem Relation Age of Onset    Breast Cancer Paternal Grandmother 70    Colon Cancer Paternal Grandfather         dx age 60's    Uterine Cancer Paternal Aunt 65    Ovarian Cancer Paternal Aunt         mid 60s    Colon Cancer Father 74    Thyroid Disorder Father     Prostate Cancer Father     Stroke  Mother 58    Diabetes Mother         unknown    Hypertension Mother         unknown    Lipids Mother         unknown    Other (Vasovagal syncope) Son 15    No Known Problems Son     Other (benign rolandic epilepsy) Son 8       MEDICATIONS:    Current Outpatient Medications:     methIMAzole 5 MG Oral Tab, Take 1 tablet (5 mg total) by mouth daily., Disp: , Rfl:     Omeprazole 40 MG Oral Capsule Delayed Release, Take 1 capsule (40 mg total) by mouth daily., Disp: 90 capsule, Rfl: 3    Multiple Vitamins-Iron (ONCE DAILY/IRON) Oral Tab, Take  by mouth daily., Disp: , Rfl:     ALLERGIES:  Allergies[1]      Review of Systems:  Constitutional:  Denies fatigue, night sweats, hot flashes  Gastrointestinal:  denies heartburn, abdominal pain, diarrhea or constipation  Genitourinary:  denies dysuria, incontinence, abnormal vaginal discharge, vaginal itching  Skin/Breast:  Denies any breast pain, lumps, or discharge.   Neurological:  denies headaches, extremity weakness or numbness.      PHYSICAL EXAM:   Constitutional: well developed, well nourished  Head/Face: normocephalic  Neck/Thyroid: thyroid symmetric, no thyromegaly, no nodules, no adenopathy  Breast: normal without palpable masses, tenderness, asymmetry, nipple discharge, nipple retraction or skin changes  Abdomen:  soft, nontender, nondistended, no masses  Skin/Hair: no unusual rashes or bruises   Extremities: no edema, no cyanosis  Psychiatric:  Oriented to time, place, person and situation. Appropriate mood and affect    Pelvic Exam:  External Genitalia: normal appearance, hair distribution, and no lesions  Urethral Meatus:  normal in size, location, without lesions and prolapse  Bladder:  No fullness, masses or tenderness  Vagina:  Normal appearance without lesions, no abnormal discharge  Cervix:  Normal without tenderness on motion without lesions.  Uterus: normal in size, contour, position, mobility, without tenderness  Adnexa: normal without masses or  tenderness  Perineum: normal  Anus: no hemorroids     Assessment & Plan:  Cassy was seen today for wellness visit.    Diagnoses and all orders for this visit:    Well woman exam with routine gynecological exam        Mammogram               [1]   Allergies  Allergen Reactions    Asparagus HIVES    Asparagus HIVES    Shellfish HIVES    Shellfish-Derived Products HIVES

## 2025-03-24 ENCOUNTER — HOSPITAL ENCOUNTER (OUTPATIENT)
Dept: MAMMOGRAPHY | Age: 55
Discharge: HOME | End: 2025-03-24
Attending: OBSTETRICS & GYNECOLOGY
Payer: COMMERCIAL

## 2025-03-24 DIAGNOSIS — Z12.31 ENCOUNTER FOR SCREENING MAMMOGRAM FOR BREAST CANCER: ICD-10-CM

## 2025-03-24 PROCEDURE — 77067 SCR MAMMO BI INCL CAD: CPT | Performed by: OBSTETRICS & GYNECOLOGY

## 2025-03-24 PROCEDURE — 77063 BREAST TOMOSYNTHESIS BI: CPT | Performed by: OBSTETRICS & GYNECOLOGY

## 2025-06-11 ENCOUNTER — HOSPITAL ENCOUNTER (OUTPATIENT)
Dept: ULTRASOUND IMAGING | Facility: HOSPITAL | Age: 55
Discharge: HOME | End: 2025-06-11
Attending: PHYSICIAN ASSISTANT
Payer: COMMERCIAL

## 2025-06-11 DIAGNOSIS — R22.9 SUBCUTANEOUS NODULE: ICD-10-CM

## 2025-06-11 PROCEDURE — 76882 US LMTD JT/FCL EVL NVASC XTR: CPT | Performed by: PHYSICIAN ASSISTANT

## (undated) DEVICE — ENDOSCOPY PACK UPPER: Brand: MEDLINE INDUSTRIES, INC.

## (undated) DEVICE — 3M™ RED DOT™ MONITORING ELECTRODE WITH FOAM TAPE AND STICKY GEL, 50/BAG, 20/CASE, 72/PLT 2570: Brand: RED DOT™

## (undated) DEVICE — 1200CC GUARDIAN II: Brand: GUARDIAN

## (undated) DEVICE — Device: Brand: DEFENDO AIR/WATER/SUCTION AND BIOPSY VALVE

## (undated) DEVICE — FILTERLINE NASAL ADULT O2/CO2

## (undated) NOTE — MR AVS SNAPSHOT
After Visit Summary   12/23/2020    Art Casper    MRN: [id number]           Visit Information     Date & Time  12/23/2020 10:00 AM Provider  Ben Solo MD [address] [facility]  Dept.  Phone  [phone]      Your Vi If you receive a survey from [employer], please take a few minutes to complete it and provide feedback. We strive to deliver the best patient experience and are looking for ways to make improvements. Your feedback will help us do so.  For more information EMERGENCY ROOM Life-threatening emergencies needing immediate intervention at a hospital emergency room.  Average cost  $2,300*   *Cost varies based on your insurance coverage  For more information about hours, locations or appointment options available at

## (undated) NOTE — MR AVS SNAPSHOT
After Visit Summary   12/1/2020    Gayla Airzmendi    MRN: [id number]           Visit Information     Date & Time  12/1/2020  3:00 PM Provider  León Huff MD [address] [facility]  Dept.  Phone  [address] [facility] Imaging Scheduling Instructions     Around December 1, 2020   Imaging:   [facility] KRISTINA 2D+3D SCREENING BILAT (OHF=[id number])    Instructions:  Your order will generate a \"Scheduling Ticket\" that will be available in [employer] to schedule on your own at a time [facility]  Monday – Friday  8:00 am – 8:00 pm   Saturday – Sunday  8:00 am – 4:00 pm    WALK-IN CARE  Primary Care Providers  Treatment for acute illness   or injury that are   non-life-threatening.   Also available by appointment

## (undated) NOTE — LETTER
Patient Name: Rohini Salas CSN: [id number]  -Age / Sex: 1970-A: 47 y  female Medical Records: [id number]    The above patient had a positive COVID test on: ____2022________      Per [facility] Infection Control guidelines this patient will NOT be retested for COVID  prior to their surgery/procedure on: _____2022_________. Thank you.